# Patient Record
Sex: FEMALE | URBAN - METROPOLITAN AREA
[De-identification: names, ages, dates, MRNs, and addresses within clinical notes are randomized per-mention and may not be internally consistent; named-entity substitution may affect disease eponyms.]

---

## 2024-03-21 NOTE — PROGRESS NOTES
CC:  Abnormal Pap smear     HPI:      Maegan Bah is a 38 y.o. No obstetric history on file. here today for a colposcopy procedure.    Last Pap result:  + HPV (non 16,18), normal cytology       Previous abnl Pap smears:  No. 2019 pap negative     HCG: negative    Nonsmoker        No Known Allergies    No current outpatient medications on file.     No current facility-administered medications for this visit.       Past Medical History:   Diagnosis Date    Abnormal Pap smear of cervix     HPV +       History reviewed. No pertinent surgical history.    History reviewed. No pertinent family history.    Social History     Socioeconomic History    Marital status: Unknown     Spouse name: Not on file    Number of children: Not on file    Years of education: Not on file    Highest education level: Not on file   Occupational History    Not on file   Tobacco Use    Smoking status: Not on file    Smokeless tobacco: Not on file   Substance and Sexual Activity    Alcohol use: Not on file    Drug use: Not on file    Sexual activity: Not on file   Other Topics Concern    Not on file   Social History Narrative    Not on file     Social Determinants of Health     Financial Resource Strain: Not on file   Food Insecurity: Not on file   Transportation Needs: Not on file   Physical Activity: Not on file   Stress: Not on file   Social Connections: Not on file   Intimate Partner Violence: Not on file   Housing Stability: Not on file       /84   Wt 106.1 kg (234 lb)   LMP 03/15/2024       Colposcopy observations:  Cervix visibility good/full visualization   SCJ: entire SCJ  NOT visualized   AW changes not visualized   Lesion color: none  Size of lesion(s): < 50% of cervix  Vascular changes (ie punctation, mosaicism) no   Colposcopic impression: Negative. ECC performed as entire SCJ not seen   Candidate for office LEEP yes       Physical Exam      Colposcopy procedure:      Consent signed and time out performed.  Patient positioned

## 2024-03-22 ENCOUNTER — PROCEDURE VISIT (OUTPATIENT)
Dept: OBGYN CLINIC | Age: 39
End: 2024-03-22

## 2024-03-22 VITALS — SYSTOLIC BLOOD PRESSURE: 128 MMHG | DIASTOLIC BLOOD PRESSURE: 84 MMHG | WEIGHT: 234 LBS

## 2024-03-22 DIAGNOSIS — R87.820 PAP SMEAR OF CERVIX SHOWS LOW RISK HPV PRESENT: Primary | ICD-10-CM

## 2024-10-24 ENCOUNTER — HOSPITAL ENCOUNTER (OUTPATIENT)
Dept: MAMMOGRAPHY | Age: 39
Discharge: HOME | End: 2024-10-27
Payer: COMMERCIAL

## 2024-10-24 VITALS — BODY MASS INDEX: 39.87 KG/M2 | HEIGHT: 63 IN | WEIGHT: 225 LBS

## 2024-10-24 DIAGNOSIS — Z12.31 ENCOUNTER FOR SCREENING MAMMOGRAM FOR BREAST CANCER: ICD-10-CM

## 2024-10-24 PROCEDURE — 77063 BREAST TOMOSYNTHESIS BI: CPT
